# Patient Record
Sex: FEMALE | Race: WHITE | NOT HISPANIC OR LATINO | URBAN - METROPOLITAN AREA
[De-identification: names, ages, dates, MRNs, and addresses within clinical notes are randomized per-mention and may not be internally consistent; named-entity substitution may affect disease eponyms.]

---

## 2020-03-20 ENCOUNTER — INPATIENT (INPATIENT)
Facility: HOSPITAL | Age: 60
LOS: 1 days | Discharge: HOME | DRG: 394 | End: 2020-03-22
Attending: SURGERY | Admitting: SURGERY
Payer: COMMERCIAL

## 2020-03-20 VITALS
HEART RATE: 98 BPM | WEIGHT: 128.97 LBS | RESPIRATION RATE: 16 BRPM | OXYGEN SATURATION: 98 % | HEIGHT: 69 IN | DIASTOLIC BLOOD PRESSURE: 78 MMHG | TEMPERATURE: 99 F | SYSTOLIC BLOOD PRESSURE: 133 MMHG

## 2020-03-20 LAB
ALBUMIN SERPL ELPH-MCNC: 3.2 G/DL — LOW (ref 3.4–5)
ALP SERPL-CCNC: 64 U/L — SIGNIFICANT CHANGE UP (ref 40–120)
ALT FLD-CCNC: 29 U/L — SIGNIFICANT CHANGE UP (ref 12–42)
AMYLASE P1 CFR SERPL: 47 U/L — SIGNIFICANT CHANGE UP (ref 25–115)
ANION GAP SERPL CALC-SCNC: 12 MMOL/L — SIGNIFICANT CHANGE UP (ref 9–16)
APPEARANCE UR: CLEAR — SIGNIFICANT CHANGE UP
AST SERPL-CCNC: 28 U/L — SIGNIFICANT CHANGE UP (ref 15–37)
BASOPHILS # BLD AUTO: 0.06 K/UL — SIGNIFICANT CHANGE UP (ref 0–0.2)
BASOPHILS NFR BLD AUTO: 0.5 % — SIGNIFICANT CHANGE UP (ref 0–2)
BILIRUB SERPL-MCNC: 0.6 MG/DL — SIGNIFICANT CHANGE UP (ref 0.2–1.2)
BILIRUB UR-MCNC: ABNORMAL
BUN SERPL-MCNC: 16 MG/DL — SIGNIFICANT CHANGE UP (ref 7–23)
CALCIUM SERPL-MCNC: 9.1 MG/DL — SIGNIFICANT CHANGE UP (ref 8.5–10.5)
CHLORIDE SERPL-SCNC: 98 MMOL/L — SIGNIFICANT CHANGE UP (ref 96–108)
CO2 SERPL-SCNC: 25 MMOL/L — SIGNIFICANT CHANGE UP (ref 22–31)
COLOR SPEC: YELLOW — SIGNIFICANT CHANGE UP
CREAT SERPL-MCNC: 0.87 MG/DL — SIGNIFICANT CHANGE UP (ref 0.5–1.3)
DIFF PNL FLD: ABNORMAL
EOSINOPHIL # BLD AUTO: 0.03 K/UL — SIGNIFICANT CHANGE UP (ref 0–0.5)
EOSINOPHIL NFR BLD AUTO: 0.2 % — SIGNIFICANT CHANGE UP (ref 0–6)
GLUCOSE SERPL-MCNC: 75 MG/DL — SIGNIFICANT CHANGE UP (ref 70–99)
GLUCOSE UR QL: NEGATIVE — SIGNIFICANT CHANGE UP
HCT VFR BLD CALC: 37.7 % — SIGNIFICANT CHANGE UP (ref 34.5–45)
HGB BLD-MCNC: 12.3 G/DL — SIGNIFICANT CHANGE UP (ref 11.5–15.5)
IMM GRANULOCYTES NFR BLD AUTO: 0.3 % — SIGNIFICANT CHANGE UP (ref 0–1.5)
KETONES UR-MCNC: 40 MG/DL
LEUKOCYTE ESTERASE UR-ACNC: ABNORMAL
LIDOCAIN IGE QN: 55 U/L — LOW (ref 73–393)
LYMPHOCYTES # BLD AUTO: 16.7 % — SIGNIFICANT CHANGE UP (ref 13–44)
LYMPHOCYTES # BLD AUTO: 2.18 K/UL — SIGNIFICANT CHANGE UP (ref 1–3.3)
MCHC RBC-ENTMCNC: 29.1 PG — SIGNIFICANT CHANGE UP (ref 27–34)
MCHC RBC-ENTMCNC: 32.6 GM/DL — SIGNIFICANT CHANGE UP (ref 32–36)
MCV RBC AUTO: 89.3 FL — SIGNIFICANT CHANGE UP (ref 80–100)
MONOCYTES # BLD AUTO: 1.13 K/UL — HIGH (ref 0–0.9)
MONOCYTES NFR BLD AUTO: 8.7 % — SIGNIFICANT CHANGE UP (ref 2–14)
NEUTROPHILS # BLD AUTO: 9.58 K/UL — HIGH (ref 1.8–7.4)
NEUTROPHILS NFR BLD AUTO: 73.6 % — SIGNIFICANT CHANGE UP (ref 43–77)
NITRITE UR-MCNC: NEGATIVE — SIGNIFICANT CHANGE UP
NRBC # BLD: 0 /100 WBCS — SIGNIFICANT CHANGE UP (ref 0–0)
PH UR: 5.5 — SIGNIFICANT CHANGE UP (ref 5–8)
PLATELET # BLD AUTO: 210 K/UL — SIGNIFICANT CHANGE UP (ref 150–400)
POTASSIUM SERPL-MCNC: 3.9 MMOL/L — SIGNIFICANT CHANGE UP (ref 3.5–5.3)
POTASSIUM SERPL-SCNC: 3.9 MMOL/L — SIGNIFICANT CHANGE UP (ref 3.5–5.3)
PROT SERPL-MCNC: 7.9 G/DL — SIGNIFICANT CHANGE UP (ref 6.4–8.2)
PROT UR-MCNC: NEGATIVE MG/DL — SIGNIFICANT CHANGE UP
RBC # BLD: 4.22 M/UL — SIGNIFICANT CHANGE UP (ref 3.8–5.2)
RBC # FLD: 13.4 % — SIGNIFICANT CHANGE UP (ref 10.3–14.5)
SODIUM SERPL-SCNC: 135 MMOL/L — SIGNIFICANT CHANGE UP (ref 132–145)
SP GR SPEC: 1.01 — SIGNIFICANT CHANGE UP (ref 1–1.03)
UROBILINOGEN FLD QL: 0.2 E.U./DL — SIGNIFICANT CHANGE UP
WBC # BLD: 13.02 K/UL — HIGH (ref 3.8–10.5)
WBC # FLD AUTO: 13.02 K/UL — HIGH (ref 3.8–10.5)

## 2020-03-20 PROCEDURE — 74177 CT ABD & PELVIS W/CONTRAST: CPT | Mod: 26

## 2020-03-20 PROCEDURE — 99285 EMERGENCY DEPT VISIT HI MDM: CPT

## 2020-03-20 PROCEDURE — 99221 1ST HOSP IP/OBS SF/LOW 40: CPT | Mod: AI

## 2020-03-20 RX ORDER — SODIUM CHLORIDE 9 MG/ML
1000 INJECTION INTRAMUSCULAR; INTRAVENOUS; SUBCUTANEOUS ONCE
Refills: 0 | Status: COMPLETED | OUTPATIENT
Start: 2020-03-20 | End: 2020-03-20

## 2020-03-20 RX ORDER — METRONIDAZOLE 500 MG
TABLET ORAL
Refills: 0 | Status: DISCONTINUED | OUTPATIENT
Start: 2020-03-20 | End: 2020-03-22

## 2020-03-20 RX ORDER — HEPARIN SODIUM 5000 [USP'U]/ML
5000 INJECTION INTRAVENOUS; SUBCUTANEOUS EVERY 12 HOURS
Refills: 0 | Status: DISCONTINUED | OUTPATIENT
Start: 2020-03-20 | End: 2020-03-22

## 2020-03-20 RX ORDER — ESTRADIOL 4 UG/1
1 INSERT VAGINAL
Qty: 0 | Refills: 0 | DISCHARGE

## 2020-03-20 RX ORDER — ONDANSETRON 8 MG/1
4 TABLET, FILM COATED ORAL EVERY 6 HOURS
Refills: 0 | Status: DISCONTINUED | OUTPATIENT
Start: 2020-03-20 | End: 2020-03-22

## 2020-03-20 RX ORDER — METRONIDAZOLE 500 MG
500 TABLET ORAL ONCE
Refills: 0 | Status: COMPLETED | OUTPATIENT
Start: 2020-03-20 | End: 2020-03-20

## 2020-03-20 RX ORDER — ACETAMINOPHEN 500 MG
650 TABLET ORAL ONCE
Refills: 0 | Status: COMPLETED | OUTPATIENT
Start: 2020-03-20 | End: 2020-03-20

## 2020-03-20 RX ORDER — ACETAMINOPHEN 500 MG
1000 TABLET ORAL ONCE
Refills: 0 | Status: DISCONTINUED | OUTPATIENT
Start: 2020-03-20 | End: 2020-03-22

## 2020-03-20 RX ORDER — SODIUM CHLORIDE 9 MG/ML
1000 INJECTION, SOLUTION INTRAVENOUS
Refills: 0 | Status: DISCONTINUED | OUTPATIENT
Start: 2020-03-20 | End: 2020-03-22

## 2020-03-20 RX ORDER — METRONIDAZOLE 500 MG
500 TABLET ORAL EVERY 8 HOURS
Refills: 0 | Status: DISCONTINUED | OUTPATIENT
Start: 2020-03-20 | End: 2020-03-22

## 2020-03-20 RX ORDER — CEFTRIAXONE 500 MG/1
1000 INJECTION, POWDER, FOR SOLUTION INTRAMUSCULAR; INTRAVENOUS EVERY 24 HOURS
Refills: 0 | Status: DISCONTINUED | OUTPATIENT
Start: 2020-03-21 | End: 2020-03-22

## 2020-03-20 RX ORDER — CEFTRIAXONE 500 MG/1
1000 INJECTION, POWDER, FOR SOLUTION INTRAMUSCULAR; INTRAVENOUS ONCE
Refills: 0 | Status: COMPLETED | OUTPATIENT
Start: 2020-03-20 | End: 2020-03-20

## 2020-03-20 RX ADMIN — CEFTRIAXONE 100 MILLIGRAM(S): 500 INJECTION, POWDER, FOR SOLUTION INTRAMUSCULAR; INTRAVENOUS at 15:41

## 2020-03-20 RX ADMIN — Medication 100 MILLIGRAM(S): at 19:01

## 2020-03-20 RX ADMIN — HEPARIN SODIUM 5000 UNIT(S): 5000 INJECTION INTRAVENOUS; SUBCUTANEOUS at 19:01

## 2020-03-20 RX ADMIN — SODIUM CHLORIDE 1000 MILLILITER(S): 9 INJECTION INTRAMUSCULAR; INTRAVENOUS; SUBCUTANEOUS at 10:34

## 2020-03-20 RX ADMIN — SODIUM CHLORIDE 100 MILLILITER(S): 9 INJECTION, SOLUTION INTRAVENOUS at 19:02

## 2020-03-20 NOTE — ED ADULT NURSE REASSESSMENT NOTE - NS ED NURSE REASSESS COMMENT FT1
Took report from [facility], pt resting flat in stretcher, states it is the most comfortable. Pt in NAD, speaking in full and complete sentences. Pendin CT scan. A&Ox3.

## 2020-03-20 NOTE — ED ADULT TRIAGE NOTE - CHIEF COMPLAINT QUOTE
Sent in by PCP for evaluation of RLQ pain x3days with nausea, no d/v. Pt saw PCP with labs done yesterday, was told to come for r/o Appy d/t elevated WBC. + intermittent fevers, afebrile in triage.

## 2020-03-20 NOTE — ED PROVIDER NOTE - CARE PLAN
Principal Discharge DX:	Acute appendicitis with localized peritonitis, without perforation, abscess, or gangrene  Secondary Diagnosis:	Acute cystitis with hematuria

## 2020-03-20 NOTE — CHART NOTE - NSCHARTNOTEFT_GEN_A_CORE
59F who presented to [facility] with complaints of intermittent fever, abdominal pain, nausea, vomiting for 3 days duration. Was admitted to Surgical service for possible appendicitis. When surgical team performing history there was some concern regarding possible need for COVID testing and called Medicine Consult. Further history gathered patient has been having sore throat off and on since December. Sore throat stopped when recent fever episodes started. Fever was associated with N/V and abdominal pain, there has been no cough or SOB during this time.     Vital Signs Last 24 Hrs  T(C): 36.8 (20 Mar 2020 17:08), Max: 37.3 (20 Mar 2020 15:37)  T(F): 98.2 (20 Mar 2020 17:08), Max: 99.1 (20 Mar 2020 15:37)  HR: 78 (20 Mar 2020 17:08) (78 - 98)  BP: 136/70 (20 Mar 2020 17:08) (130/75 - 137/70)  BP(mean): --  RR: 17 (20 Mar 2020 17:08) (16 - 18)  SpO2: 100% (20 Mar 2020 17:08) (98% - 100%)        LABS                        12.3   13.02 )-----------( 210      ( 20 Mar 2020 10:50 )             37.7     03-20    135  |  98  |  16  ----------------------------<  75  3.9   |  25  |  0.87    Ca    9.1      20 Mar 2020 10:50    TPro  7.9  /  Alb  3.2<L>  /  TBili  0.6  /  DBili  x   /  AST  28  /  ALT  29  /  AlkPhos  64  03-20      Urinalysis Basic - ( 20 Mar 2020 10:50 )    Color: Yellow / Appearance: Clear / S.015 / pH: x  Gluc: x / Ketone: 40 mg/dL  / Bili: Small / Urobili: 0.2 E.U./dL   Blood: x / Protein: NEGATIVE mg/dL / Nitrite: NEGATIVE   Leuk Esterase: Moderate / RBC: 5-10 /HPF / WBC > 10 /HPF   Sq Epi: x / Non Sq Epi: Many /HPF / Bacteria: Many /HPF      Assessment:  Patient currently does not meet guidelines for testing for COVID-19 given there is no cough or SOB. Labs also do not show lymphopenia or transaminitis. Likely sources of fever include Appendicitis and/or UTI.   -Recommend no COVID testing at this time, if symptoms change please alert Med Consult team  -Based off CTAP would recommend echocardiogram to evaluate pericardial effusion  -Fever sources likely appendicitis vs UTI  -This is not a formal Medicine Consult, if you would like Medicine Consult to continue to follow the patient please let team know    Case discussed with COVID on call attending.

## 2020-03-20 NOTE — H&P ADULT - NSHPPHYSICALEXAM_GEN_ALL_CORE
Vital Signs Last 24 Hrs  T(C): 36.8 (20 Mar 2020 17:08), Max: 37.3 (20 Mar 2020 15:37)  T(F): 98.2 (20 Mar 2020 17:08), Max: 99.1 (20 Mar 2020 15:37)  HR: 78 (20 Mar 2020 17:08) (78 - 98)  BP: 136/70 (20 Mar 2020 17:08) (130/75 - 137/70)  BP(mean): --  RR: 17 (20 Mar 2020 17:08) (16 - 18)  SpO2: 100% (20 Mar 2020 17:08) (98% - 100%) Vital Signs Last 24 Hrs  T(C): 36.8 (20 Mar 2020 17:08), Max: 37.3 (20 Mar 2020 15:37)  T(F): 98.2 (20 Mar 2020 17:08), Max: 99.1 (20 Mar 2020 15:37)  HR: 78 (20 Mar 2020 17:08) (78 - 98)  BP: 136/70 (20 Mar 2020 17:08) (130/75 - 137/70)  BP(mean): --  RR: 17 (20 Mar 2020 17:08) (16 - 18)  SpO2: 100% (20 Mar 2020 17:08) (98% - 100%)    General: NAD, resting comfortably in bed  C/V: NSR  Pulm: Nonlabored breathing, no respiratory distress  Abd: soft, nondistended, mildly TTP throughout abdomen, no rebound/guarding  Extrem: WWP, no edema

## 2020-03-20 NOTE — ED PROVIDER NOTE - RESPIRATORY, MLM
Incoming call from patient, she states, she does have the proper required documentation and will bring them with her to the Stamaril vaccine appointment.  Chart Prep complete.   Breath sounds clear and equal bilaterally.

## 2020-03-20 NOTE — ED PROVIDER NOTE - OBJECTIVE STATEMENT
60 y/o female with no significant PMHx, on Yuvafem, presents to ED c/o abd pain x 3 days. Patient reports bilateral lower abd pain, greater in the RLQ, with associated intermittent fever with tmax of 38.6C, nausea, and decreased appetite. Also reports frequent loose stools but denies any diarrhea. Also denies any vomiting, blood in stool, vaginal discharge, urinary symptoms, or back pain. States she went to the doctor yesterday for blood work, and was told today her WBC was elevated, so she came to the ED. Patient has not taken any meds for thsi. Denies any prior abd surgeries.

## 2020-03-20 NOTE — H&P ADULT - HISTORY OF PRESENT ILLNESS
59F w/ no significant PMHx comes in with abdomal pain and "flu like symptoms" x unknown number of days. She states a couple of nights ago she started to feel like she had the flu and states she had a fever (38.6), sore throat, and back pain. Last night she began to have abdominal pain throughout her lower abdomen. She states she has had loose stools throughout this time. Yesterday she went to the Dr where labs were drawn and she was told she had an elefated WBC and was sent to [facility].  Denies N/V, constipation, sick contacts. Last colonscopy 2-3 years ago (found redundant colon?) otherwise wnl.

## 2020-03-20 NOTE — H&P ADULT - NSHPLABSRESULTS_GEN_ALL_CORE
LABS:                        12.3   13.02 )-----------( 210      ( 20 Mar 2020 10:50 )             37.7     -    135  |  98  |  16  ----------------------------<  75  3.9   |  25  |  0.87    Ca    9.1      20 Mar 2020 10:50    TPro  7.9  /  Alb  3.2<L>  /  TBili  0.6  /  DBili  x   /  AST  28  /  ALT  29  /  AlkPhos  64        Urinalysis Basic - ( 20 Mar 2020 10:50 )    Color: Yellow / Appearance: Clear / S.015 / pH: x  Gluc: x / Ketone: 40 mg/dL  / Bili: Small / Urobili: 0.2 E.U./dL   Blood: x / Protein: NEGATIVE mg/dL / Nitrite: NEGATIVE   Leuk Esterase: Moderate / RBC: 5-10 /HPF / WBC > 10 /HPF   Sq Epi: x / Non Sq Epi: Many /HPF / Bacteria: Many /HPF        RADIOLOGY & ADDITIONAL STUDIES:  CT Abdomen and Pelvis w/ IV Cont:   EXAM:  CT ABDOMEN AND PELVIS IC                           PROCEDURE DATE:  2020      INTERPRETATION:  CT SCAN OF ABDOMEN AND PELVIS    History: Right lower quadrant pain, elevated WBC    Technique: CT scan of abdomen and pelvis was performed from lung bases through symphysis pubis. Intravenous contrast material were utilized. Axial, sagittal and coronal reformatted images were reviewed. 100 mL of Omnipaque 350 injected.    Comparison: None.    Findings:     Lower chest: Moderate inferior pericardial effusion-partly seen. Trace pleural fluid in the right posterior costophrenic angle.  Liver:  There is a 1.7 x 1.1 x 1.9 cm well-circumscribed hypoattenuating lesion at the medial aspect of the caudate lobe and less well-defined hypoattenuating area adjacent to the falciform ligament, probable for focal fatty infiltrates. There are 2 subcentimeter hypoattenuating lesions within the right lobe, too small to characterize. The three lesions in the liver are suspicious for hemangiomata.  Gallbladder: No radiopaque stones gallbladder.  Spleen:  Normal.  Pancreas:  Normal.  Adrenal glands:  Normal.  Kidneys: Normal.  Adenopathy:  No lymphadenopathy in abdomen or pelvis.  Ascites: None.  Gastrointestinal tract: Evaluation is limited without the use of oral contrast. There is a slightly thick-walled blind-ending tubular structure measuring 1.2 cm in diameter in the pelvis (best seen on series 2 image 86), suspicious for acute appendicitis. The appendix is pelvic. The appendix is situated medially to the right ovary and laterally to the uterine fundus.The appendix does not contain air. There is no periappendiceal abscess. No appendicolith. No free air. There are scattered colonic diverticula.  Small amount of pelvic free fluid.  Vessels: There is scant calcification within the bilateral common iliac arteries. Dilated IVC  Pelvic organs: There is a 1.9 cm intramural leiomyoma left fundus. A subcentimeter focal hyperdensity seen in the mid uterine body could represent another leiomyoma. The ovaries are grossly normal.. The bladder is unremarkable. No adnexal masses.  Soft tissues: Normal.  Bones: There is a subcentimeter focal sclerotic bone island at the left inferior articular facet of the L3 vertebral body. Otherwise unremarkable.    Impression:   Dilated and thick-walled appendix measuring up to 1.2 cm in diameter, suspicious for  acute appendicitis. The appendix is pelvic. The appendix is situated medially to the right ovary and laterallyto the uterine fundus. No periappendiceal abscess or free air..  Incidental pericardial effusion.

## 2020-03-20 NOTE — ED PROVIDER NOTE - CLINICAL SUMMARY MEDICAL DECISION MAKING FREE TEXT BOX
58 y/o female presents with abd pain x 3 days with intermittent fever and nausea. Patient had blood work done at clinic yesterday and was told today that her WBC was elevated. VSS, patient is currently afebrile. Will obtain blood work, CT abd/pelvis to r/o appendicitis, provide IV fluids for hydration, and will reassess. Patient offered antiemetics but declined at this time.

## 2020-03-20 NOTE — ED PROVIDER NOTE - PROGRESS NOTE DETAILS
discussed case with Dr. Camarillo who accepts pt. will give ceftriaxone for UTI and Dr. Camarillo said no additional abx needed for appendicitis.

## 2020-03-20 NOTE — H&P ADULT - ATTENDING COMMENTS
Patient seen and examined at bedside on 3/21/2020.  Stated pain is possibly mildly improved.    Abdomen soft, ND, mild RLQ tenderness.    Labs, CT reviewed.  WBC down to 9 today (13 on admission).    Repeat CT abdomen/pelvis with PO and IV contrast to fully evaluate GI tract given atypical presentation

## 2020-03-20 NOTE — H&P ADULT - ASSESSMENT
59F no significant PMHx presents with abdominal pain and flu like symptoms for 3-4 nights (abd pain starting last night), WBC 13 and CT findings of dilated and thick-walled appendix measuring up to 1.2 cm in diameter, suspicious for acute appendicitis, appendix is pelvic.    NPO/IVF  Pain/nausea control  OOBA/IS/SCDs  Ceftriaxone/flagyl

## 2020-03-20 NOTE — ED PROVIDER NOTE - ATTENDING CONTRIBUTION TO CARE
Pt w lower abd pain, worked up for abd pain. leukocytosis, CT abd w dilated appendix concerning for appendicitis.

## 2020-03-21 LAB
ANION GAP SERPL CALC-SCNC: 17 MMOL/L — SIGNIFICANT CHANGE UP (ref 5–17)
APTT BLD: 41.9 SEC — HIGH (ref 27.5–36.3)
BLD GP AB SCN SERPL QL: NEGATIVE — SIGNIFICANT CHANGE UP
BUN SERPL-MCNC: 12 MG/DL — SIGNIFICANT CHANGE UP (ref 7–23)
CALCIUM SERPL-MCNC: 9.2 MG/DL — SIGNIFICANT CHANGE UP (ref 8.4–10.5)
CHLORIDE SERPL-SCNC: 103 MMOL/L — SIGNIFICANT CHANGE UP (ref 96–108)
CO2 SERPL-SCNC: 22 MMOL/L — SIGNIFICANT CHANGE UP (ref 22–31)
CREAT SERPL-MCNC: 0.72 MG/DL — SIGNIFICANT CHANGE UP (ref 0.5–1.3)
CULTURE RESULTS: SIGNIFICANT CHANGE UP
GLUCOSE BLDC GLUCOMTR-MCNC: 74 MG/DL — SIGNIFICANT CHANGE UP (ref 70–99)
GLUCOSE SERPL-MCNC: 57 MG/DL — LOW (ref 70–99)
HCT VFR BLD CALC: 38.7 % — SIGNIFICANT CHANGE UP (ref 34.5–45)
HGB BLD-MCNC: 12.2 G/DL — SIGNIFICANT CHANGE UP (ref 11.5–15.5)
INR BLD: 1.32 — HIGH (ref 0.88–1.16)
MAGNESIUM SERPL-MCNC: 2.2 MG/DL — SIGNIFICANT CHANGE UP (ref 1.6–2.6)
MCHC RBC-ENTMCNC: 28.8 PG — SIGNIFICANT CHANGE UP (ref 27–34)
MCHC RBC-ENTMCNC: 31.5 GM/DL — LOW (ref 32–36)
MCV RBC AUTO: 91.5 FL — SIGNIFICANT CHANGE UP (ref 80–100)
NRBC # BLD: 0 /100 WBCS — SIGNIFICANT CHANGE UP (ref 0–0)
PHOSPHATE SERPL-MCNC: 3.1 MG/DL — SIGNIFICANT CHANGE UP (ref 2.5–4.5)
PLATELET # BLD AUTO: 230 K/UL — SIGNIFICANT CHANGE UP (ref 150–400)
POTASSIUM SERPL-MCNC: 4.3 MMOL/L — SIGNIFICANT CHANGE UP (ref 3.5–5.3)
POTASSIUM SERPL-SCNC: 4.3 MMOL/L — SIGNIFICANT CHANGE UP (ref 3.5–5.3)
PROTHROM AB SERPL-ACNC: 15.2 SEC — HIGH (ref 10–12.9)
RBC # BLD: 4.23 M/UL — SIGNIFICANT CHANGE UP (ref 3.8–5.2)
RBC # FLD: 13.4 % — SIGNIFICANT CHANGE UP (ref 10.3–14.5)
RH IG SCN BLD-IMP: POSITIVE — SIGNIFICANT CHANGE UP
SODIUM SERPL-SCNC: 142 MMOL/L — SIGNIFICANT CHANGE UP (ref 135–145)
SPECIMEN SOURCE: SIGNIFICANT CHANGE UP
WBC # BLD: 9.15 K/UL — SIGNIFICANT CHANGE UP (ref 3.8–10.5)
WBC # FLD AUTO: 9.15 K/UL — SIGNIFICANT CHANGE UP (ref 3.8–10.5)

## 2020-03-21 PROCEDURE — 74177 CT ABD & PELVIS W/CONTRAST: CPT | Mod: 26

## 2020-03-21 PROCEDURE — 93306 TTE W/DOPPLER COMPLETE: CPT | Mod: 26

## 2020-03-21 RX ORDER — METOPROLOL TARTRATE 50 MG
5 TABLET ORAL ONCE
Refills: 0 | Status: COMPLETED | OUTPATIENT
Start: 2020-03-21 | End: 2020-03-21

## 2020-03-21 RX ORDER — DEXTROSE 50 % IN WATER 50 %
25 SYRINGE (ML) INTRAVENOUS ONCE
Refills: 0 | Status: COMPLETED | OUTPATIENT
Start: 2020-03-21 | End: 2020-03-21

## 2020-03-21 RX ORDER — IOHEXOL 300 MG/ML
30 INJECTION, SOLUTION INTRAVENOUS ONCE
Refills: 0 | Status: COMPLETED | OUTPATIENT
Start: 2020-03-21 | End: 2020-03-21

## 2020-03-21 RX ADMIN — Medication 25 MILLILITER(S): at 09:36

## 2020-03-21 RX ADMIN — Medication 100 MILLIGRAM(S): at 11:31

## 2020-03-21 RX ADMIN — Medication 5 MILLIGRAM(S): at 07:09

## 2020-03-21 RX ADMIN — Medication 100 MILLIGRAM(S): at 19:34

## 2020-03-21 RX ADMIN — HEPARIN SODIUM 5000 UNIT(S): 5000 INJECTION INTRAVENOUS; SUBCUTANEOUS at 18:05

## 2020-03-21 RX ADMIN — SODIUM CHLORIDE 110 MILLILITER(S): 9 INJECTION, SOLUTION INTRAVENOUS at 03:01

## 2020-03-21 RX ADMIN — HEPARIN SODIUM 5000 UNIT(S): 5000 INJECTION INTRAVENOUS; SUBCUTANEOUS at 06:00

## 2020-03-21 RX ADMIN — CEFTRIAXONE 100 MILLIGRAM(S): 500 INJECTION, POWDER, FOR SOLUTION INTRAMUSCULAR; INTRAVENOUS at 12:27

## 2020-03-21 RX ADMIN — Medication 100 MILLIGRAM(S): at 03:01

## 2020-03-21 RX ADMIN — IOHEXOL 30 MILLILITER(S): 300 INJECTION, SOLUTION INTRAVENOUS at 14:00

## 2020-03-21 NOTE — PROGRESS NOTE ADULT - SUBJECTIVE AND OBJECTIVE BOX
INTERVAL HPI/OVERNIGHT EVENTS: +F/+BM (today), pain improved, +N/-V, No covid testing. This AM pt is complaining of chest fluttering. EKG performed, which showed 's. Cardiology consulted for possible afib vs. SVT. 5 metoprolol given. Otherwise, pt feeling okay, denies any n/v/cp/sob.     MEDICATIONS  (STANDING):  cefTRIAXone   IVPB 1000 milliGRAM(s) IV Intermittent every 24 hours  heparin  Injectable 5000 Unit(s) SubCutaneous every 12 hours  lactated ringers. 1000 milliLiter(s) (110 mL/Hr) IV Continuous <Continuous>  metroNIDAZOLE  IVPB 500 milliGRAM(s) IV Intermittent every 8 hours  metroNIDAZOLE  IVPB        MEDICATIONS  (PRN):  acetaminophen  IVPB .. 1000 milliGRAM(s) IV Intermittent once PRN Moderate Pain (4 - 6)  ondansetron Injectable 4 milliGRAM(s) IV Push every 6 hours PRN Nausea      Vital Signs Last 24 Hrs  T(C): 36.4 (21 Mar 2020 05:12), Max: 37.7 (20 Mar 2020 20:32)  T(F): 97.6 (21 Mar 2020 05:12), Max: 99.9 (20 Mar 2020 20:32)  HR: 136 (21 Mar 2020 07:07) (63 - 136)  BP: 102/65 (21 Mar 2020 07:07) (102/65 - 137/70)  BP(mean): --  RR: 16 (21 Mar 2020 07:07) (16 - 18)  SpO2: 98% (21 Mar 2020 07:07) (96% - 100%)    PHYSICAL EXAM:      Constitutional: A&Ox3    Respiratory: non labored breathing, no respiratory distress    Cardiovascular: tachycardic to 150's    Gastrointestinal: abdomen is soft, tender to palpation in RLQ, non-distended                Extremities: (-) edema                  I&O's Detail    20 Mar 2020 07:01  -  21 Mar 2020 07:00  --------------------------------------------------------  IN:    IV PiggyBack: 100 mL    lactated ringers.: 1360 mL    Solution: 100 mL  Total IN: 1560 mL    OUT:    Voided: 400 mL  Total OUT: 400 mL    Total NET: 1160 mL      21 Mar 2020 07:01  -  21 Mar 2020 07:27  --------------------------------------------------------  IN:    lactated ringers.: 110 mL  Total IN: 110 mL    OUT:  Total OUT: 0 mL    Total NET: 110 mL          LABS:                        12.2   9.15  )-----------( 230      ( 21 Mar 2020 06:23 )             38.7     03-20    135  |  98  |  16  ----------------------------<  75  3.9   |  25  |  0.87    Ca    9.1      20 Mar 2020 10:50    TPro  7.9  /  Alb  3.2<L>  /  TBili  0.6  /  DBili  x   /  AST  28  /  ALT  29  /  AlkPhos  64  03-20    PT/INR - ( 21 Mar 2020 06:23 )   PT: 15.2 sec;   INR: 1.32          PTT - ( 21 Mar 2020 06:23 )  PTT:41.9 sec  Urinalysis Basic - ( 20 Mar 2020 10:50 )    Color: Yellow / Appearance: Clear / S.015 / pH: x  Gluc: x / Ketone: 40 mg/dL  / Bili: Small / Urobili: 0.2 E.U./dL   Blood: x / Protein: NEGATIVE mg/dL / Nitrite: NEGATIVE   Leuk Esterase: Moderate / RBC: 5-10 /HPF / WBC > 10 /HPF   Sq Epi: x / Non Sq Epi: Many /HPF / Bacteria: Many /HPF        RADIOLOGY & ADDITIONAL STUDIES:

## 2020-03-21 NOTE — CHART NOTE - NSCHARTNOTEFT_GEN_A_CORE
Upon Nutritional Assessment by the Registered Dietitian your patient was determined to meet criteria / has evidence of the following diagnosis/diagnoses:          [ ]  Mild Protein Calorie Malnutrition        [x ]  Moderate Protein Calorie Malnutrition        [ ] Severe Protein Calorie Malnutrition        [ ] Unspecified Protein Calorie Malnutrition        [ ] Underweight / BMI <19        [ ] Morbid Obesity / BMI > 40      Findings as based on:  •  Comprehensive nutrition assessment and consultation    Pt is noted with moderate b/l orbital, moderate b/l buccal and moderate b/l clavicular wasting.    Given current moderate muscle and fat wasting in combination with suspected inadequate energy intake and reported weight loss there is concern for moderate malnutrition.    Treatment:    1. As medically feasible, advance diet to Clear Liquids   2. As tolerated, continue diet advancement to Regular  3. Monitor for GI distress  4. Pending PO intake, consider ONS as necessary to meet estimated nutrient requirements  5. Add Multivitamin daily     PROVIDER Section:     By signing this assessment you are acknowledging and agree with the diagnosis/diagnoses assigned by the Registered Dietitian    Comments: no

## 2020-03-21 NOTE — CONSULT NOTE ADULT - ASSESSMENT
59 F w/ hx of mitral valve prolapse (dx early 20's) admitted to surgery for acute appendicitis. Cardiology consulted for new tachyarrhythmia.     # Atrial Fibrillation, new onset. Hemodynamically stable. s/p Lopressor 5mg IVP x 1 with HR improving 100-110.   - hx of MVP; please obtain TTE   - CHADS-VASc of 1 & potentially going to OR today for appendicitis, therefore, no need for anticoagulation at the moment.    Incomplete Note 59 F w/ hx of mitral valve prolapse (dx early 20's) admitted to surgery for acute appendicitis. Cardiology consulted for new tachyarrhythmia.     # Atrial Fibrillation, new onset. Hemodynamically stable. s/p Lopressor 5mg IVP x 1. Resolved, currently NSR 60's. Etiology likely 2/2 sepsis from acute appendicitis and hypoglycemia.  - CHADS-VASc of 1 & potentially going to OR today for appendicitis, therefore, no need for anticoagulation at the moment.  - No need for rate control right now. If were to go back into atrial fibrillation, would start Lopressor 2.5mg IVP q8hr  - continue to treat underlying cause w/ IV antibiotics and IV fluid     #Moderate Pericardial Effusion: Incidental finding on TTE. Unclear etiology  - No clinical signs of tamponade   - continue treatment with IV fluids   - monitor on telemetry for HD instability     No contraindication for surgery from a cardiology perspective     Will continue to follow

## 2020-03-21 NOTE — DIETITIAN INITIAL EVALUATION ADULT. - OTHER INFO
Pt with past medical history significant for mitral valve prolapse.  Pt presents with abdominal pain, flu-like symptoms (fever and sore throat); CT findings of dilated and thick-walled appendix suspicious for acute appendicitis.  Pt endorses good appetite/intake at baseline.  Meals consist of well-cooked vegetables, avocado, tomato, lean proteins.  Pt denies any food allergies/intolerances, however, endorses GI distress with excessive raw or fatty foods.  Pt reports unintentional weight loss x3-5 years ago (pt unable to recall exact time frame).  UBW ~63-65kg; 8.5-10.5kg weight loss reported with lowest weight ~54.5kg of which pt attributes to GI distress/PO intolerance.  Pt reports regain of some weight and is currently stable at 58.5kg since Summer 2019.  Pt reports success with CBD oil for improved digestion.  Pt is noted with moderate b/l orbital, moderate b/l buccal and moderate b/l clavicular wasting.  Given current moderate muscle and fat wasting in combination with suspected inadequate energy intake and reported weight loss there is concern for moderate malnutrition.  Pt remains NPO at this time.  Pt endorses some hunger, however, is feeling more thirsty.  Pt denies n/v/d/c or pain.  Skin: intact.

## 2020-03-21 NOTE — DIETITIAN INITIAL EVALUATION ADULT. - ADD RECOMMEND
1. As medically feasible, advance diet to Clear Liquids   2. Monitor for GI distress  3. As tolerated, continue diet advancement to Regular  4. Please confirm suspected malnutrition

## 2020-03-21 NOTE — DIETITIAN INITIAL EVALUATION ADULT. - ENERGY NEEDS
ABW used to calculate energy needs due to pt's current body weight within % IBW   Needs calculated for weight maintenance and increased secondary to inflammation and suspected moderate malnutrition

## 2020-03-21 NOTE — CONSULT NOTE ADULT - SUBJECTIVE AND OBJECTIVE BOX
HPI:  59 F w/ hx of mitral valve prolapse (dx early 20's) admitted to surgery for acute appendicitis. Cardiology consulted for new tachyarrhythmia Patient felt palpitations, fast, "bounding" heart rate this morning. ECG was obtained, which showed atrial fibrillation w/ -150's. She received Lopressor 5mg IVP x 1 dose with decreasing -110. Patient seen and examined, feels "better", without chest pain, lightheadedness, or SOB. She denies any hx of arrythmia or other cardiac disease. Family hx + father w/ HTN.       CARDIAC DIAGNOSTIC TESTING:      ECG: A-Fib w/ 's     ECHO  FINDINGS: pending       PAST MEDICAL & SURGICAL HISTORY:  No pertinent past medical history  No significant past surgical history      HOME MEDICATIONS  T(C): 36.4 (20 @ 05:12), Max: 37.7 (20 @ 20:32)  HR: 106 (20 @ 07:33) (63 - 136)  BP: 109/65 (20 @ 07:33) (102/65 - 137/70)  RR: 16 (20 @ 07:33) (16 - 18)  SpO2: 98% (20 @ 07:07) (96% - 100%)    MEDICATIONS  (STANDING):  cefTRIAXone   IVPB 1000 milliGRAM(s) IV Intermittent every 24 hours  heparin  Injectable 5000 Unit(s) SubCutaneous every 12 hours  lactated ringers. 1000 milliLiter(s) (110 mL/Hr) IV Continuous <Continuous>  metroNIDAZOLE  IVPB 500 milliGRAM(s) IV Intermittent every 8 hours  metroNIDAZOLE  IVPB        MEDICATIONS  (PRN):  acetaminophen  IVPB .. 1000 milliGRAM(s) IV Intermittent once PRN Moderate Pain (4 - 6)  ondansetron Injectable 4 milliGRAM(s) IV Push every 6 hours PRN Nausea      FAMILY HISTORY: + Father, HTN    REVIEW OF SYSTEMS:  CONSTITUTIONAL: No fever, weight loss, or fatigue  EYES: No eye pain, visual disturbances, or discharge  ENMT:  No difficulty hearing, tinnitus, vertigo; No sinus or throat pain  NECK: No pain or stiffness  RESPIRATORY: No cough, wheezing, chills or hemoptysis; No Shortness of Breath  CARDIOVASCULAR: No chest pain, palpitations, passing out, dizziness, or leg swelling  GASTROINTESTINAL: No abdominal or epigastric pain. No nausea, vomiting, or hematemesis; No diarrhea or constipation. No melena or hematochezia.  GENITOURINARY: No dysuria, frequency, hematuria, or incontinence  NEUROLOGICAL: No headaches, memory loss, loss of strength, numbness, or tremors  SKIN: No itching, burning, rashes, or lesions   LYMPH Nodes: No enlarged glands  ENDOCRINE: No heat or cold intolerance; No hair loss  MUSCULOSKELETAL: No joint pain or swelling; No muscle, back, or extremity pain  PSYCHIATRIC: No depression, anxiety, mood swings, or difficulty sleeping  HEME/LYMPH: No easy bruising, or bleeding gums  ALLERY AND IMMUNOLOGIC: No hives or eczema    Vitals past 24 Hours: T(C): 36.4 (20 @ 05:12), Max: 37.7 (20 @ 20:32)  HR: 106 (20 @ 07:33) (63 - 136)  BP: 109/65 (20 @ 07:33) (102/65 - 137/70)  RR: 16 (20 @ 07:33) (16 - 18)  SpO2: 98% (20 @ 07:07) (96% - 100%)	    PHYSICAL EXAM:  GEN: No acute respiratory distress, appears stated age	  HEENT: Anicteric sclera, PERRL, EOMI, no oropharyngeal erythema or discharge, trachea midline  Lymphatic: No cervical lymphadenopathy  Cardiovascular: S1 S2, No JVD, No murmurs, PMI  Respiratory: Lungs clear to auscultation, no rrw  Gastrointestinal:  BS+, soft, non distended, non tender, no HSM  Skin: No rashes, No ecchymoses, No cyanosis  Neurologic: Non-focal, AAO x 3, strength grossly normal in all extremeties  Extremities: Normal range of motion, No clubbing, cyanosis or edema  Vascular: Radial 2+, DP 2+      I&O's Detail    20 Mar 2020 07:01  -  21 Mar 2020 07:00  --------------------------------------------------------  IN:    IV PiggyBack: 100 mL    lactated ringers.: 1360 mL    Solution: 100 mL  Total IN: 1560 mL    OUT:    Voided: 400 mL  Total OUT: 400 mL    Total NET: 1160 mL      21 Mar 2020 07:01  -  21 Mar 2020 07:53  --------------------------------------------------------  IN:    lactated ringers.: 110 mL  Total IN: 110 mL    OUT:  Total OUT: 0 mL    Total NET: 110 mL          LABS:                        12.2   9.15  )-----------( 230      ( 21 Mar 2020 06:23 )             38.7         142  |  103  |  12  ----------------------------<  57<L>  4.3   |  22  |  0.72    Ca    9.1      20 Mar 2020 10:50  Phos  3.1       Mg     2.2         TPro  7.9  /  Alb  3.2<L>  /  TBili  0.6  /  DBili  x   /  AST  28  /  ALT  29  /  AlkPhos  64  -20        PT/INR - ( 21 Mar 2020 06:23 )   PT: 15.2 sec;   INR: 1.32          PTT - ( 21 Mar 2020 06:23 )  PTT:41.9 sec  Urinalysis Basic - ( 20 Mar 2020 10:50 )    Color: Yellow / Appearance: Clear / S.015 / pH: x  Gluc: x / Ketone: 40 mg/dL  / Bili: Small / Urobili: 0.2 E.U./dL   Blood: x / Protein: NEGATIVE mg/dL / Nitrite: NEGATIVE   Leuk Esterase: Moderate / RBC: 5-10 /HPF / WBC > 10 /HPF   Sq Epi: x / Non Sq Epi: Many /HPF / Bacteria: Many /HPF      I&O's Summary    20 Mar 2020 07:01  -  21 Mar 2020 07:00  --------------------------------------------------------  IN: 1560 mL / OUT: 400 mL / NET: 1160 mL    21 Mar 2020 07:01  -  21 Mar 2020 07:53  --------------------------------------------------------  IN: 110 mL / OUT: 0 mL / NET: 110 mL        RADIOLOGY & ADDITIONAL STUDIES:  < from: CT Abdomen and Pelvis w/ IV Cont (20 @ 13:27) >  Impression:   Dilated and thick-walled appendix measuring up to 1.2 cm in diameter, suspicious for  acute appendicitis. The appendix is pelvic. The appendix is situated medially to the right ovary and laterallyto the uterine fundus. No periappendiceal abscess or free air..  Incidental pericardial effusion.    < end of copied text > HPI:  59 F w/ hx of mitral valve prolapse (dx early 20's) admitted to surgery for acute appendicitis. Cardiology consulted for new tachyarrhythmia Patient felt palpitations, fast, "bounding" heart rate this morning. ECG was obtained, which showed atrial fibrillation w/ -150's. She received Lopressor 5mg IVP x 1 dose with decreasing -110. Patient seen and examined, feels "better", without chest pain, lightheadedness, or SOB. She denies any hx of arrythmia or other cardiac disease. Family hx + father w/ HTN.       CARDIAC DIAGNOSTIC TESTING:      ECG: A-Fib w/ 's     ECHO  FINDINGS:   < from: TTE Echo Complete w/o contrast w/ Doppler (20 @ 09:05) >  CONCLUSIONS:     1. Patient tachycardic during the study.   2. No significant valvular disease.   3. Normal left and right ventricular systolic function.   4. Moderate pericardial effusion. There is no significant (<30%) respiratory variation in the maximal mitral E wave velocity ( despite patient being in atrial fibrillation). Fibrinous vs coagulant material is seen in the pericardial space. There is early diastolic RV base invagination. No other chamber collapse noted. The inferior vena cava is dilated (>2.1cm) with abnormal inspiratory collapse (<50%) consistent with elevated right atrial pressure (  15, range 10-20mmHg). All these findings are suggestive of increased intrapericardial pressure. Cardiac tamponade is a clinical diagnosis. Clinical correlation required.      PAST MEDICAL & SURGICAL HISTORY:  No pertinent past medical history  No significant past surgical history      HOME MEDICATIONS  T(C): 36.4 (20 @ 05:12), Max: 37.7 (20 @ 20:32)  HR: 106 (20 @ 07:33) (63 - 136)  BP: 109/65 (20 @ 07:33) (102/65 - 137/70)  RR: 16 (20 @ 07:33) (16 - 18)  SpO2: 98% (20 @ 07:07) (96% - 100%)    MEDICATIONS  (STANDING):  cefTRIAXone   IVPB 1000 milliGRAM(s) IV Intermittent every 24 hours  heparin  Injectable 5000 Unit(s) SubCutaneous every 12 hours  lactated ringers. 1000 milliLiter(s) (110 mL/Hr) IV Continuous <Continuous>  metroNIDAZOLE  IVPB 500 milliGRAM(s) IV Intermittent every 8 hours  metroNIDAZOLE  IVPB        MEDICATIONS  (PRN):  acetaminophen  IVPB .. 1000 milliGRAM(s) IV Intermittent once PRN Moderate Pain (4 - 6)  ondansetron Injectable 4 milliGRAM(s) IV Push every 6 hours PRN Nausea      FAMILY HISTORY: + Father, HTN    REVIEW OF SYSTEMS:  CONSTITUTIONAL: No fever, weight loss, or fatigue  EYES: No eye pain, visual disturbances, or discharge  ENMT:  No difficulty hearing, tinnitus, vertigo; No sinus or throat pain  NECK: No pain or stiffness  RESPIRATORY: No cough, wheezing, chills or hemoptysis; No Shortness of Breath  CARDIOVASCULAR: No chest pain, palpitations, passing out, dizziness, or leg swelling  GASTROINTESTINAL: No abdominal or epigastric pain. No nausea, vomiting, or hematemesis; No diarrhea or constipation. No melena or hematochezia.  GENITOURINARY: No dysuria, frequency, hematuria, or incontinence  NEUROLOGICAL: No headaches, memory loss, loss of strength, numbness, or tremors  SKIN: No itching, burning, rashes, or lesions   LYMPH Nodes: No enlarged glands  ENDOCRINE: No heat or cold intolerance; No hair loss  MUSCULOSKELETAL: No joint pain or swelling; No muscle, back, or extremity pain  PSYCHIATRIC: No depression, anxiety, mood swings, or difficulty sleeping  HEME/LYMPH: No easy bruising, or bleeding gums  ALLERY AND IMMUNOLOGIC: No hives or eczema    Vitals past 24 Hours: T(C): 36.4 (20 @ 05:12), Max: 37.7 (20 @ 20:32)  HR: 106 (20 @ 07:33) (63 - 136)  BP: 109/65 (20 @ 07:33) (102/65 - 137/70)  RR: 16 (20 @ 07:33) (16 - 18)  SpO2: 98% (20 @ 07:07) (96% - 100%)	    PHYSICAL EXAM:  GEN: No acute respiratory distress, appears stated age	  HEENT: Anicteric sclera, PERRL, EOMI, no oropharyngeal erythema or discharge, trachea midline  Lymphatic: No cervical lymphadenopathy  Cardiovascular: S1 S2, No JVD, No murmurs, PMI  Respiratory: Lungs clear to auscultation, no rrw  Gastrointestinal:  BS+, soft, non distended, non tender, no HSM  Skin: No rashes, No ecchymoses, No cyanosis  Neurologic: Non-focal, AAO x 3, strength grossly normal in all extremeties  Extremities: Normal range of motion, No clubbing, cyanosis or edema  Vascular: Radial 2+, DP 2+      I&O's Detail    20 Mar 2020 07:01  -  21 Mar 2020 07:00  --------------------------------------------------------  IN:    IV PiggyBack: 100 mL    lactated ringers.: 1360 mL    Solution: 100 mL  Total IN: 1560 mL    OUT:    Voided: 400 mL  Total OUT: 400 mL    Total NET: 1160 mL      21 Mar 2020 07:01  -  21 Mar 2020 07:53  --------------------------------------------------------  IN:    lactated ringers.: 110 mL  Total IN: 110 mL    OUT:  Total OUT: 0 mL    Total NET: 110 mL          LABS:                        12.2   9.15  )-----------( 230      ( 21 Mar 2020 06:23 )             38.7     -    142  |  103  |  12  ----------------------------<  57<L>  4.3   |  22  |  0.72    Ca    9.1      20 Mar 2020 10:50  Phos  3.1     -  Mg     2.2     -    TPro  7.9  /  Alb  3.2<L>  /  TBili  0.6  /  DBili  x   /  AST  28  /  ALT  29  /  AlkPhos  64  03-20        PT/INR - ( 21 Mar 2020 06:23 )   PT: 15.2 sec;   INR: 1.32          PTT - ( 21 Mar 2020 06:23 )  PTT:41.9 sec  Urinalysis Basic - ( 20 Mar 2020 10:50 )    Color: Yellow / Appearance: Clear / S.015 / pH: x  Gluc: x / Ketone: 40 mg/dL  / Bili: Small / Urobili: 0.2 E.U./dL   Blood: x / Protein: NEGATIVE mg/dL / Nitrite: NEGATIVE   Leuk Esterase: Moderate / RBC: 5-10 /HPF / WBC > 10 /HPF   Sq Epi: x / Non Sq Epi: Many /HPF / Bacteria: Many /HPF      I&O's Summary    20 Mar 2020 07:  -  21 Mar 2020 07:00  --------------------------------------------------------  IN: 1560 mL / OUT: 400 mL / NET: 1160 mL    21 Mar 2020 07:  -  21 Mar 2020 07:53  --------------------------------------------------------  IN: 110 mL / OUT: 0 mL / NET: 110 mL        RADIOLOGY & ADDITIONAL STUDIES:  < from: CT Abdomen and Pelvis w/ IV Cont (20 @ 13:27) >  Impression:   Dilated and thick-walled appendix measuring up to 1.2 cm in diameter, suspicious for  acute appendicitis. The appendix is pelvic. The appendix is situated medially to the right ovary and laterallyto the uterine fundus. No periappendiceal abscess or free air..  Incidental pericardial effusion.

## 2020-03-22 ENCOUNTER — TRANSCRIPTION ENCOUNTER (OUTPATIENT)
Age: 60
End: 2020-03-22

## 2020-03-22 VITALS
HEART RATE: 68 BPM | OXYGEN SATURATION: 99 % | TEMPERATURE: 98 F | SYSTOLIC BLOOD PRESSURE: 120 MMHG | DIASTOLIC BLOOD PRESSURE: 68 MMHG | RESPIRATION RATE: 17 BRPM

## 2020-03-22 LAB
ANION GAP SERPL CALC-SCNC: 19 MMOL/L — HIGH (ref 5–17)
BUN SERPL-MCNC: 9 MG/DL — SIGNIFICANT CHANGE UP (ref 7–23)
CALCIUM SERPL-MCNC: 8.7 MG/DL — SIGNIFICANT CHANGE UP (ref 8.4–10.5)
CHLORIDE SERPL-SCNC: 100 MMOL/L — SIGNIFICANT CHANGE UP (ref 96–108)
CO2 SERPL-SCNC: 20 MMOL/L — LOW (ref 22–31)
CREAT SERPL-MCNC: 0.72 MG/DL — SIGNIFICANT CHANGE UP (ref 0.5–1.3)
GLUCOSE BLDC GLUCOMTR-MCNC: 45 MG/DL — CRITICAL LOW (ref 70–99)
GLUCOSE SERPL-MCNC: 43 MG/DL — CRITICAL LOW (ref 70–99)
HCT VFR BLD CALC: 34.4 % — LOW (ref 34.5–45)
HCV AB S/CO SERPL IA: 0.22 S/CO — SIGNIFICANT CHANGE UP
HCV AB SERPL-IMP: SIGNIFICANT CHANGE UP
HGB BLD-MCNC: 10.8 G/DL — LOW (ref 11.5–15.5)
MAGNESIUM SERPL-MCNC: 1.8 MG/DL — SIGNIFICANT CHANGE UP (ref 1.6–2.6)
MCHC RBC-ENTMCNC: 28.8 PG — SIGNIFICANT CHANGE UP (ref 27–34)
MCHC RBC-ENTMCNC: 31.4 GM/DL — LOW (ref 32–36)
MCV RBC AUTO: 91.7 FL — SIGNIFICANT CHANGE UP (ref 80–100)
NRBC # BLD: 0 /100 WBCS — SIGNIFICANT CHANGE UP (ref 0–0)
PHOSPHATE SERPL-MCNC: 3.2 MG/DL — SIGNIFICANT CHANGE UP (ref 2.5–4.5)
PLATELET # BLD AUTO: 206 K/UL — SIGNIFICANT CHANGE UP (ref 150–400)
POTASSIUM SERPL-MCNC: 3.6 MMOL/L — SIGNIFICANT CHANGE UP (ref 3.5–5.3)
POTASSIUM SERPL-SCNC: 3.6 MMOL/L — SIGNIFICANT CHANGE UP (ref 3.5–5.3)
RBC # BLD: 3.75 M/UL — LOW (ref 3.8–5.2)
RBC # FLD: 13.3 % — SIGNIFICANT CHANGE UP (ref 10.3–14.5)
SODIUM SERPL-SCNC: 139 MMOL/L — SIGNIFICANT CHANGE UP (ref 135–145)
WBC # BLD: 6.81 K/UL — SIGNIFICANT CHANGE UP (ref 3.8–10.5)
WBC # FLD AUTO: 6.81 K/UL — SIGNIFICANT CHANGE UP (ref 3.8–10.5)

## 2020-03-22 PROCEDURE — 86901 BLOOD TYPING SEROLOGIC RH(D): CPT

## 2020-03-22 PROCEDURE — 85027 COMPLETE CBC AUTOMATED: CPT

## 2020-03-22 PROCEDURE — 99238 HOSP IP/OBS DSCHRG MGMT 30/<: CPT

## 2020-03-22 PROCEDURE — 83735 ASSAY OF MAGNESIUM: CPT

## 2020-03-22 PROCEDURE — 80048 BASIC METABOLIC PNL TOTAL CA: CPT

## 2020-03-22 PROCEDURE — 96374 THER/PROPH/DIAG INJ IV PUSH: CPT | Mod: XU

## 2020-03-22 PROCEDURE — 85610 PROTHROMBIN TIME: CPT

## 2020-03-22 PROCEDURE — 87086 URINE CULTURE/COLONY COUNT: CPT

## 2020-03-22 PROCEDURE — 74177 CT ABD & PELVIS W/CONTRAST: CPT

## 2020-03-22 PROCEDURE — 36415 COLL VENOUS BLD VENIPUNCTURE: CPT

## 2020-03-22 PROCEDURE — 82150 ASSAY OF AMYLASE: CPT

## 2020-03-22 PROCEDURE — 83690 ASSAY OF LIPASE: CPT

## 2020-03-22 PROCEDURE — 80053 COMPREHEN METABOLIC PANEL: CPT

## 2020-03-22 PROCEDURE — 86803 HEPATITIS C AB TEST: CPT

## 2020-03-22 PROCEDURE — 86850 RBC ANTIBODY SCREEN: CPT

## 2020-03-22 PROCEDURE — 85730 THROMBOPLASTIN TIME PARTIAL: CPT

## 2020-03-22 PROCEDURE — 82962 GLUCOSE BLOOD TEST: CPT

## 2020-03-22 PROCEDURE — 85025 COMPLETE CBC W/AUTO DIFF WBC: CPT

## 2020-03-22 PROCEDURE — 93306 TTE W/DOPPLER COMPLETE: CPT

## 2020-03-22 PROCEDURE — 99285 EMERGENCY DEPT VISIT HI MDM: CPT | Mod: 25

## 2020-03-22 PROCEDURE — 81001 URINALYSIS AUTO W/SCOPE: CPT

## 2020-03-22 PROCEDURE — 84100 ASSAY OF PHOSPHORUS: CPT

## 2020-03-22 RX ORDER — DIPHENHYDRAMINE HCL 50 MG
25 CAPSULE ORAL AT BEDTIME
Refills: 0 | Status: DISCONTINUED | OUTPATIENT
Start: 2020-03-22 | End: 2020-03-22

## 2020-03-22 RX ORDER — ZOLPIDEM TARTRATE 10 MG/1
5 TABLET ORAL AT BEDTIME
Refills: 0 | Status: DISCONTINUED | OUTPATIENT
Start: 2020-03-22 | End: 2020-03-22

## 2020-03-22 RX ADMIN — CEFTRIAXONE 100 MILLIGRAM(S): 500 INJECTION, POWDER, FOR SOLUTION INTRAMUSCULAR; INTRAVENOUS at 13:03

## 2020-03-22 RX ADMIN — HEPARIN SODIUM 5000 UNIT(S): 5000 INJECTION INTRAVENOUS; SUBCUTANEOUS at 07:20

## 2020-03-22 RX ADMIN — Medication 100 MILLIGRAM(S): at 10:58

## 2020-03-22 RX ADMIN — Medication 100 MILLIGRAM(S): at 03:08

## 2020-03-22 NOTE — DISCHARGE NOTE PROVIDER - NSDCMRMEDTOKEN_GEN_ALL_CORE_FT
amoxicillin-clavulanate 875 mg-125 mg oral tablet: 1 tab(s) orally every 12 hours   Yuvafem 10 mcg vaginal tablet: 1 tab(s) vaginal 2 times a week

## 2020-03-22 NOTE — PROGRESS NOTE ADULT - SUBJECTIVE AND OBJECTIVE BOX
INTERVAL HPI/OVERNIGHT EVENTS: No acute events, repeat CT shows acute uncomplicated appendicitis               SUBJECTIVE:  Flatus: [ ] YES [ ] NO             Bowel Movement: [ ] YES [ ] NO  Pain (0-10):            Pain Control Adequate: [ ] YES [ ] NO  Nausea: [ ] YES [ ] NO            Vomiting: [ ] YES [ ] NO  Diarrhea: [ ] YES [ ] NO         Constipation: [ ] YES [ ] NO     Chest Pain: [ ] YES [ ] NO    SOB:  [ ] YES [ ] NO    MEDICATIONS  (STANDING):  cefTRIAXone   IVPB 1000 milliGRAM(s) IV Intermittent every 24 hours  heparin  Injectable 5000 Unit(s) SubCutaneous every 12 hours  lactated ringers. 1000 milliLiter(s) (110 mL/Hr) IV Continuous <Continuous>  metroNIDAZOLE  IVPB 500 milliGRAM(s) IV Intermittent every 8 hours  metroNIDAZOLE  IVPB        MEDICATIONS  (PRN):  acetaminophen  IVPB .. 1000 milliGRAM(s) IV Intermittent once PRN Moderate Pain (4 - 6)  ondansetron Injectable 4 milliGRAM(s) IV Push every 6 hours PRN Nausea  zolpidem 5 milliGRAM(s) Oral at bedtime PRN Insomnia      Vital Signs Last 24 Hrs  T(C): 36.6 (22 Mar 2020 09:22), Max: 36.7 (21 Mar 2020 16:59)  T(F): 97.8 (22 Mar 2020 09:22), Max: 98 (21 Mar 2020 16:59)  HR: 59 (22 Mar 2020 09:22) (52 - 69)  BP: 108/59 (22 Mar 2020 09:22) (100/52 - 110/66)  BP(mean): --  RR: 16 (22 Mar 2020 09:22) (16 - 17)  SpO2: 100% (22 Mar 2020 09:22) (98% - 100%)    PHYSICAL EXAM:      Constitutional: A&Ox3    Breasts:    Respiratory: non labored breathing, no respiratory distress    Cardiovascular: NSR, RRR    Gastrointestinal:                 Incision:    Genitourinary:    Extremities: (-) edema                  I&O's Detail    21 Mar 2020 07:01  -  22 Mar 2020 07:00  --------------------------------------------------------  IN:    IV PiggyBack: 50 mL    lactated ringers.: 2090 mL    Solution: 200 mL  Total IN: 2340 mL    OUT:    Voided: 700 mL  Total OUT: 700 mL    Total NET: 1640 mL          LABS:                        10.8   6.81  )-----------( 206      ( 22 Mar 2020 08:10 )             34.4     03-22    139  |  100  |  9   ----------------------------<  43<LL>  3.6   |  20<L>  |  0.72    Ca    8.7      22 Mar 2020 08:10  Phos  3.2     03-22  Mg     1.8     03-22      PT/INR - ( 21 Mar 2020 06:23 )   PT: 15.2 sec;   INR: 1.32          PTT - ( 21 Mar 2020 06:23 )  PTT:41.9 sec      RADIOLOGY & ADDITIONAL STUDIES: INTERVAL HPI/OVERNIGHT EVENTS: No acute events, repeat CT shows acute uncomplicated appendicitis               SUBJECTIVE:  Flatus: [x ] YES [ ] NO             Bowel Movement: [x ] YES [ ] NO  Pain (0-10):            Pain Control Adequate: [x ] YES [ ] NO  Nausea: [ ] YES [x ] NO            Vomiting: [ ] YES [x ] NO  Diarrhea: [ ] YES [x ] NO         Constipation: [ ] YES [x ] NO     Chest Pain: [ ] YES [ x] NO    SOB:  [ ] YES x[ ] NO    MEDICATIONS  (STANDING):  cefTRIAXone   IVPB 1000 milliGRAM(s) IV Intermittent every 24 hours  heparin  Injectable 5000 Unit(s) SubCutaneous every 12 hours  lactated ringers. 1000 milliLiter(s) (110 mL/Hr) IV Continuous <Continuous>  metroNIDAZOLE  IVPB 500 milliGRAM(s) IV Intermittent every 8 hours  metroNIDAZOLE  IVPB        MEDICATIONS  (PRN):  acetaminophen  IVPB .. 1000 milliGRAM(s) IV Intermittent once PRN Moderate Pain (4 - 6)  ondansetron Injectable 4 milliGRAM(s) IV Push every 6 hours PRN Nausea  zolpidem 5 milliGRAM(s) Oral at bedtime PRN Insomnia      Vital Signs Last 24 Hrs  T(C): 36.6 (22 Mar 2020 09:22), Max: 36.7 (21 Mar 2020 16:59)  T(F): 97.8 (22 Mar 2020 09:22), Max: 98 (21 Mar 2020 16:59)  HR: 59 (22 Mar 2020 09:22) (52 - 69)  BP: 108/59 (22 Mar 2020 09:22) (100/52 - 110/66)  BP(mean): --  RR: 16 (22 Mar 2020 09:22) (16 - 17)  SpO2: 100% (22 Mar 2020 09:22) (98% - 100%)    PHYSICAL EXAM:      Constitutional: A&Ox3    Respiratory: non labored breathing, no respiratory distress    Cardiovascular: NSR, RRR    Gastrointestinal: Soft, non distended, mild RLQ, no guarding or rebound                 Incision:    Genitourinary: voids    Extremities: (-) edema                  I&O's Detail    21 Mar 2020 07:01  -  22 Mar 2020 07:00  --------------------------------------------------------  IN:    IV PiggyBack: 50 mL    lactated ringers.: 2090 mL    Solution: 200 mL  Total IN: 2340 mL    OUT:    Voided: 700 mL  Total OUT: 700 mL    Total NET: 1640 mL          LABS:                        10.8   6.81  )-----------( 206      ( 22 Mar 2020 08:10 )             34.4     03-22    139  |  100  |  9   ----------------------------<  43<LL>  3.6   |  20<L>  |  0.72    Ca    8.7      22 Mar 2020 08:10  Phos  3.2     03-22  Mg     1.8     03-22      PT/INR - ( 21 Mar 2020 06:23 )   PT: 15.2 sec;   INR: 1.32          PTT - ( 21 Mar 2020 06:23 )  PTT:41.9 sec      RADIOLOGY & ADDITIONAL STUDIES:

## 2020-03-22 NOTE — DISCHARGE NOTE PROVIDER - NSDCACTIVITY_GEN_ALL_CORE
Bathing allowed/Driving allowed/Walking - Outdoors allowed/No heavy lifting/straining/Stairs allowed/Do not drive or operate machinery/Do not make important decisions/Walking - Indoors allowed/Showering allowed

## 2020-03-22 NOTE — DISCHARGE NOTE PROVIDER - NSDCCPCAREPLAN_GEN_ALL_CORE_FT
PRINCIPAL DISCHARGE DIAGNOSIS  Diagnosis: Acute appendicitis with localized peritonitis, without perforation, abscess, or gangrene  Assessment and Plan of Treatment:       SECONDARY DISCHARGE DIAGNOSES  Diagnosis: Acute cystitis with hematuria  Assessment and Plan of Treatment:

## 2020-03-22 NOTE — DISCHARGE NOTE PROVIDER - CARE PROVIDER_API CALL
Marilu Camarillo)  Surgery  [address]  [place], [place] [address]  Phone: [phone]  Fax: [phone]  Follow Up Time:

## 2020-03-22 NOTE — DISCHARGE NOTE PROVIDER - NSDCFUADDINST_GEN_ALL_CORE_FT
Return to the hospital if you are experiencing worsening pain, fever, chills, nausea, or vomiting.  Take the antibiotics: Augmentin 875mg twice a day for 7 days as sent to your pharmacy [facility] [address] [place] [address]

## 2020-03-22 NOTE — DISCHARGE NOTE PROVIDER - INSTRUCTIONS
Plan:  Discharge home on regular diet-advance diet slowly from bland, soft foods to more solids as you are able  Follow up with Dr. Camarillo this week

## 2020-03-22 NOTE — PROGRESS NOTE ADULT - ASSESSMENT
59F PMH mitral valve prolapse presents with abdominal pain and flu like symptoms for 3-4 nights (abd pain starting last night), WBC 13 and CT findings of dilated and thick-walled appendix measuring up to 1.2 cm in diameter, suspicious for acute appendicitis, appendix is pelvic.    NPO/IVF  Pain/nausea control  OOBA/IS/SCDs  Ceftriaxone/flagyl  Cardiology consult
60 y/o F with appendicitis- improving  -Advance diet to clears then regular if tolerated  -F/u labs  -repeat abdominal exams  -Discharge home on Augmentin x 1 week

## 2020-03-22 NOTE — DISCHARGE NOTE NURSING/CASE MANAGEMENT/SOCIAL WORK - PATIENT PORTAL LINK FT
You can access the FollowMyHealth Patient Portal offered by [facility] by registering at the following website: [email or web address]. By joining [facility]’s FollowMyHealth portal, you will also be able to view your health information using other applications (apps) compatible with our system.

## 2020-03-22 NOTE — DISCHARGE NOTE PROVIDER - HOSPITAL COURSE
58 y/o Female with abdominal pain found to have uncomplicated appendicitis. She was admitted and made NPO and pain was managed. She was evaluated by cardiology due to tachycardia, which resolved with one dose of lopressor. Cardiology stated likely a-fib, after echocardiogram was performed, but declined to start anticoagulation. The decision was made to manage the patient medically at this time. She improved and her diet was advanced. She was able to tolerate small amounts of food. She did have some loose stools however these were likely attributed to the contrast she drank for her CT. She remained afebrile and her WBC improved. She has been ambulating throughout the unit without difficulty and appears stable for discharge home. 58 y/o Female with abdominal pain found to have uncomplicated appendicitis. She was admitted and made NPO and pain was managed. She was evaluated by cardiology due to tachycardia, which resolved with one dose of lopressor. Cardiology stated likely a-fib, after echocardiogram was performed, but declined to start anticoagulation. The decision was made to manage the patient medically at this time. She improved and her diet was advanced. She was able to tolerate small amounts of food. She did have some loose stools however these were likely attributed to the contrast she drank for her CT. She remained afebrile and her WBC improved. She has been ambulating throughout the unit without difficulty and appears stable for discharge home.        Plan:    Discharge home on regular diet-advance diet slowly from bland, soft foods to more solids as you are able    Follow up with Dr. Camarillo this week    Return to the hospital if you are experiencing worsening pain, fever, chills, nausea, or vomiting.    Take the antibiotics: Augmentin 875mg twice a day for 7 days as sent to your pharmacy [facility] [address] [place] [address]

## 2020-03-25 DIAGNOSIS — I48.91 UNSPECIFIED ATRIAL FIBRILLATION: ICD-10-CM

## 2020-03-25 DIAGNOSIS — D18.03 HEMANGIOMA OF INTRA-ABDOMINAL STRUCTURES: ICD-10-CM

## 2020-03-25 DIAGNOSIS — K35.80 UNSPECIFIED ACUTE APPENDICITIS: ICD-10-CM

## 2020-03-25 DIAGNOSIS — Z88.6 ALLERGY STATUS TO ANALGESIC AGENT: ICD-10-CM

## 2020-03-25 DIAGNOSIS — T50.995A ADVERSE EFFECT OF OTHER DRUGS, MEDICAMENTS AND BIOLOGICAL SUBSTANCES, INITIAL ENCOUNTER: ICD-10-CM
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
## 2020-03-25 DIAGNOSIS — I31.3 PERICARDIAL EFFUSION (NONINFLAMMATORY): ICD-10-CM

## 2020-03-25 DIAGNOSIS — I97.89 OTHER POSTPROCEDURAL COMPLICATIONS AND DISORDERS OF THE CIRCULATORY SYSTEM, NOT ELSEWHERE CLASSIFIED: ICD-10-CM

## 2020-03-25 DIAGNOSIS — N30.01 ACUTE CYSTITIS WITH HEMATURIA: ICD-10-CM

## 2020-03-25 DIAGNOSIS — R10.9 UNSPECIFIED ABDOMINAL PAIN: ICD-10-CM

## 2020-03-25 DIAGNOSIS — K52.1 TOXIC GASTROENTERITIS AND COLITIS: ICD-10-CM

## 2020-03-25 DIAGNOSIS — D25.9 LEIOMYOMA OF UTERUS, UNSPECIFIED: ICD-10-CM

## 2020-11-25 PROBLEM — Z78.9 OTHER SPECIFIED HEALTH STATUS: Chronic | Status: ACTIVE | Noted: 2020-03-20

## 2020-12-29 PROBLEM — Z00.00 ENCOUNTER FOR PREVENTIVE HEALTH EXAMINATION: Status: ACTIVE | Noted: 2020-12-29

## 2020-12-31 ENCOUNTER — APPOINTMENT (OUTPATIENT)
Dept: GASTROENTEROLOGY | Facility: CLINIC | Age: 60
End: 2020-12-31
Payer: COMMERCIAL

## 2020-12-31 VITALS
OXYGEN SATURATION: 98 % | HEART RATE: 76 BPM | RESPIRATION RATE: 15 BRPM | SYSTOLIC BLOOD PRESSURE: 98 MMHG | TEMPERATURE: 97.2 F | HEIGHT: 70 IN | DIASTOLIC BLOOD PRESSURE: 69 MMHG | WEIGHT: 129 LBS | BODY MASS INDEX: 18.47 KG/M2

## 2020-12-31 DIAGNOSIS — K59.09 OTHER CONSTIPATION: ICD-10-CM

## 2020-12-31 PROCEDURE — 99072 ADDL SUPL MATRL&STAF TM PHE: CPT

## 2020-12-31 PROCEDURE — 99204 OFFICE O/P NEW MOD 45 MIN: CPT

## 2021-01-04 NOTE — HISTORY OF PRESENT ILLNESS
[de-identified] : 60F ref by LARRY ([facility] PT) for multiple GI questions\par \par acute appendicitis 3/20 this year s/p antibiotics then had appendectomy 5/2020 then PAIN started 4 days after going home with antibiotics lower central abdomen. AFTER BOWEL MOVEMENT, DULL, doesn’t wake up at night. also had arrythmia during this time with liquid aorund the heart\par \par on/off constipation-  even before appendectomy type 1 [place] stool chart. eats bread +cheese\par \par hemithyroidectomy and thyroid levels abnormal with TSH ~6.99 but normal T4\par \par had liver cyst finding on CT that was confirmed by MRI to be hemangioma\par \par Other= sensitive to salt, smells. having musculoskeletal issues that limit her walking/ambulation\par \par EGD 2016\par Colonoscopy 3/2020

## 2021-01-04 NOTE — PHYSICAL EXAM
[General Appearance - Alert] : alert [General Appearance - In No Acute Distress] : in no acute distress [Sclera] : the sclera and conjunctiva were normal [Outer Ear] : the ears and nose were normal in appearance [] : no respiratory distress [Apical Impulse] : the apical impulse was normal [Edema] : there was no peripheral edema [Bowel Sounds] : normal bowel sounds [Abdomen Soft] : soft [Abdomen Tenderness] : non-tender [Abdomen Mass (___ Cm)] : no abdominal mass palpated [No Spinal Tenderness] : no spinal tenderness [Involuntary Movements] : no involuntary movements were seen [No Focal Deficits] : no focal deficits [Mood] : the mood was normal

## 2021-01-04 NOTE — ASSESSMENT
[FreeTextEntry1] : 60F with abdominal pain likely related to constipation\par - will try supportive care for constipation with magnesium, increased dietary fiber and cutting out constipating foods like cheese, increase hydration, increase ambulation as much as possible\par - if no relief, will continue workup with ARM, smartpill etc.\par - f/u 3mo

## 2021-01-05 ENCOUNTER — TRANSCRIPTION ENCOUNTER (OUTPATIENT)
Age: 61
End: 2021-01-05

## 2021-01-05 LAB
ALBUMIN SERPL ELPH-MCNC: 4.4 G/DL
ALP BLD-CCNC: 49 U/L
ALT SERPL-CCNC: 17 U/L
ANION GAP SERPL CALC-SCNC: 12 MMOL/L
AST SERPL-CCNC: 24 U/L
BILIRUB SERPL-MCNC: 0.2 MG/DL
BUN SERPL-MCNC: 20 MG/DL
CALCIUM SERPL-MCNC: 9.2 MG/DL
CHLORIDE SERPL-SCNC: 102 MMOL/L
CO2 SERPL-SCNC: 26 MMOL/L
CREAT SERPL-MCNC: 0.9 MG/DL
GLUCOSE SERPL-MCNC: 95 MG/DL
POTASSIUM SERPL-SCNC: 4.4 MMOL/L
PROT SERPL-MCNC: 7.5 G/DL
SODIUM SERPL-SCNC: 140 MMOL/L

## 2021-02-02 ENCOUNTER — TRANSCRIPTION ENCOUNTER (OUTPATIENT)
Age: 61
End: 2021-02-02

## 2021-02-08 ENCOUNTER — TRANSCRIPTION ENCOUNTER (OUTPATIENT)
Age: 61
End: 2021-02-08

## 2021-02-12 ENCOUNTER — APPOINTMENT (OUTPATIENT)
Dept: GASTROENTEROLOGY | Facility: CLINIC | Age: 61
End: 2021-02-12
Payer: COMMERCIAL

## 2021-02-12 VITALS
OXYGEN SATURATION: 98 % | HEART RATE: 67 BPM | DIASTOLIC BLOOD PRESSURE: 61 MMHG | BODY MASS INDEX: 18.32 KG/M2 | SYSTOLIC BLOOD PRESSURE: 99 MMHG | HEIGHT: 70 IN | TEMPERATURE: 97.5 F | WEIGHT: 128 LBS

## 2021-02-12 DIAGNOSIS — L60.3 NAIL DYSTROPHY: ICD-10-CM

## 2021-02-12 PROCEDURE — 99072 ADDL SUPL MATRL&STAF TM PHE: CPT

## 2021-02-12 PROCEDURE — 99215 OFFICE O/P EST HI 40 MIN: CPT

## 2021-02-12 NOTE — PHYSICAL EXAM
[General Appearance - Alert] : alert [General Appearance - In No Acute Distress] : in no acute distress [Sclera] : the sclera and conjunctiva were normal [Outer Ear] : the ears and nose were normal in appearance [] : no respiratory distress [Apical Impulse] : the apical impulse was normal [Edema] : there was no peripheral edema [Bowel Sounds] : normal bowel sounds [Abdomen Soft] : soft [Abdomen Tenderness] : non-tender [Abdomen Mass (___ Cm)] : no abdominal mass palpated [No Spinal Tenderness] : no spinal tenderness [Involuntary Movements] : no involuntary movements were seen [FreeTextEntry1] : splitting nails [No Focal Deficits] : no focal deficits [Mood] : the mood was normal

## 2021-02-12 NOTE — ASSESSMENT
[FreeTextEntry1] : 60F with abdominal pain and chronic reflux intolerant to PPIs but responded to REGLAN therefore likely dysmotiltiy\par - smart pill \par - iberograst\par - alginate for reflux\par - pelvic floor/abdominal work\par - magnesium, increased dietary fiber and cutting out constipating foods like cheese, increase hydration, increase ambulation as much as possible\par - if no relief, will continue workup with ARM\par - check nutritional deficiencies assoc with brittle nails (iron panel nml), refer to DERM\par - f/u after above

## 2021-02-12 NOTE — HISTORY OF PRESENT ILLNESS
[de-identified] : 60F ref by LARRY ([facility] PT) for multiple GI questions\par 2/12/21\par 10 years ago acid took aciphex but bad reaction\par 4-5 yrs ago had another endoscopy \par reglan helped\par now found to have LPR\par tried dexilant and got terrible headache\par tried psyllium husk \par now sometimes normal/sometimes constipated\par \par \par PREVIOUS HX\par acute appendicitis 3/20 this year s/p antibiotics then had appendectomy 5/2020 then PAIN started 4 days after going home with antibiotics lower central abdomen. AFTER BOWEL MOVEMENT, DULL, doesn’t wake up at night. also had arrythmia during this time with liquid aorund the heart\par \par on/off constipation-  even before appendectomy type 1 [place] stool chart. eats bread +cheese\par \par hemithyroidectomy and thyroid levels abnormal with TSH ~6.99 but normal T4\par \par had liver cyst finding on CT that was confirmed by MRI to be hemangioma\par \par Other= sensitive to salt, smells. having musculoskeletal issues that limit her walking/ambulation\par \par EGD 2016\par Colonoscopy 3/2020

## 2021-02-12 NOTE — HISTORY OF PRESENT ILLNESS
[de-identified] : 60F ref by LARRY ([facility] FLO[facility] PT) for multiple GI questions\par 2/12/21\par 10 years ago acid took aciphex but bad reaction\par 4-5 yrs ago had another endoscopy \par reglan helped\par now found to have LPR\par tried dexilant and got terrible headache\par tried psyllium husk \par now sometimes normal/sometimes constipated\par \par \par PREVIOUS HX\par acute appendicitis 3/20 this year s/p antibiotics then had appendectomy 5/2020 then PAIN started 4 days after going home with antibiotics lower central abdomen. AFTER BOWEL MOVEMENT, DULL, doesn’t wake up at night. also had arrythmia during this time with liquid aorund the heart\par \par on/off constipation-  even before appendectomy type 1 [place] stool chart. eats bread +cheese\par \par hemithyroidectomy and thyroid levels abnormal with TSH ~6.99 but normal T4\par \par had liver cyst finding on CT that was confirmed by MRI to be hemangioma\par \par Other= sensitive to salt, smells. having musculoskeletal issues that limit her walking/ambulation\par \par EGD 2016\par Colonoscopy 3/2020

## 2021-02-17 ENCOUNTER — TRANSCRIPTION ENCOUNTER (OUTPATIENT)
Age: 61
End: 2021-02-17

## 2021-02-17 LAB
25(OH)D3 SERPL-MCNC: 30.7 NG/ML
FOLATE SERPL-MCNC: 15.2 NG/ML
MAGNESIUM SERPL-MCNC: 2.2 MG/DL
VIT B12 SERPL-MCNC: 1119 PG/ML
ZINC SERPL-MCNC: 98 UG/DL

## 2021-02-23 ENCOUNTER — TRANSCRIPTION ENCOUNTER (OUTPATIENT)
Age: 61
End: 2021-02-23

## 2021-02-23 LAB
NICOTINAMIDE: 42.5 NG/ML
NICOTINIC ACID: <5 NG/ML
VIT B2 SERPL-MCNC: 274 UG/L
VIT B6 SERPL-MCNC: 65.7 UG/L
VIT C SERPL-MCNC: 0.5 MG/DL

## 2021-03-03 ENCOUNTER — TRANSCRIPTION ENCOUNTER (OUTPATIENT)
Age: 61
End: 2021-03-03

## 2021-03-05 ENCOUNTER — TRANSCRIPTION ENCOUNTER (OUTPATIENT)
Age: 61
End: 2021-03-05

## 2021-03-10 ENCOUNTER — TRANSCRIPTION ENCOUNTER (OUTPATIENT)
Age: 61
End: 2021-03-10

## 2021-03-20 ENCOUNTER — TRANSCRIPTION ENCOUNTER (OUTPATIENT)
Age: 61
End: 2021-03-20

## 2021-03-31 ENCOUNTER — TRANSCRIPTION ENCOUNTER (OUTPATIENT)
Age: 61
End: 2021-03-31

## 2021-04-05 ENCOUNTER — TRANSCRIPTION ENCOUNTER (OUTPATIENT)
Age: 61
End: 2021-04-05

## 2021-04-22 ENCOUNTER — TRANSCRIPTION ENCOUNTER (OUTPATIENT)
Age: 61
End: 2021-04-22

## 2021-05-06 ENCOUNTER — APPOINTMENT (OUTPATIENT)
Dept: DERMATOLOGY | Facility: CLINIC | Age: 61
End: 2021-05-06
Payer: COMMERCIAL

## 2021-05-06 DIAGNOSIS — L60.3 NAIL DYSTROPHY: ICD-10-CM

## 2021-05-06 PROCEDURE — 99072 ADDL SUPL MATRL&STAF TM PHE: CPT

## 2021-05-06 PROCEDURE — 99203 OFFICE O/P NEW LOW 30 MIN: CPT

## 2021-05-06 RX ORDER — FOLIC ACID 1 MG/1
1 TABLET ORAL DAILY
Qty: 30 | Refills: 11 | Status: ACTIVE | COMMUNITY
Start: 2021-05-06 | End: 1900-01-01

## 2021-05-06 NOTE — CONSULT LETTER
[Dear  ___] : Dear  [unfilled], [Consult Letter:] : I had the pleasure of evaluating your patient, [unfilled]. [Please see my note below.] : Please see my note below. [Consult Closing:] : Thank you very much for allowing me to participate in the care of this patient.  If you have any questions, please do not hesitate to contact me. [Sincerely,] : Sincerely, [FreeTextEntry3] : Delphine Mendoza MD\par [facility]\par

## 2021-05-06 NOTE — HISTORY OF PRESENT ILLNESS
[FreeTextEntry1] : nails [de-identified] : referred by Dr Valverde\par fingernails - told has brittle nails, extremely fragile, avoids water, using citra 2 for thin peeling nails and ammonium lactate but only at night and touches eyes at night (read light sensitive)\par \par toenails - 2nd left and 3rd right developed fungus (tested) 2 years ago, one on left fell off and regrew but very painful so had ingrowns removed on both nails\par did not use any fungus treatment\par more recently went to "medi pedi" given oil, also were digging under to remove some of the thickening\par also given nystatin/trimacinolone for nails to use nightly and wrap in plastic x 30 days\par nail improved look but still pain and growing back thick so told to use urea 40% for 2 mos\par wants second opinion\par \par

## 2021-05-06 NOTE — PHYSICAL EXAM
[Alert] : alert [Oriented x 3] : ~L oriented x 3 [Well Nourished] : well nourished [Conjunctiva Non-injected] : conjunctiva non-injected [No Visual Lymphadenopathy] : no visual  lymphadenopathy [No Clubbing] : no clubbing [No Edema] : no edema [No Bromhidrosis] : no bromhidrosis [No Chromhidrosis] : no chromhidrosis [FreeTextEntry3] : some onychoschizia fingernails with clear polish\par toenails - right 3rd and left 2nd toenails are short, narrowed compared to the other toenails, and without thickening or subunugual debris\par

## 2021-05-17 ENCOUNTER — TRANSCRIPTION ENCOUNTER (OUTPATIENT)
Age: 61
End: 2021-05-17

## 2021-05-25 ENCOUNTER — TRANSCRIPTION ENCOUNTER (OUTPATIENT)
Age: 61
End: 2021-05-25

## 2021-07-28 ENCOUNTER — APPOINTMENT (OUTPATIENT)
Dept: GASTROENTEROLOGY | Facility: HOSPITAL | Age: 61
End: 2021-07-28

## 2021-10-25 DIAGNOSIS — K21.9 GASTRO-ESOPHAGEAL REFLUX DISEASE W/OUT ESOPHAGITIS: ICD-10-CM

## 2021-10-25 RX ORDER — OMEPRAZOLE 10 MG/1
10 CAPSULE, DELAYED RELEASE ORAL
Qty: 30 | Refills: 2 | Status: ACTIVE | COMMUNITY
Start: 2021-10-25 | End: 1900-01-01

## 2021-10-30 ENCOUNTER — TRANSCRIPTION ENCOUNTER (OUTPATIENT)
Age: 61
End: 2021-10-30

## 2021-11-01 ENCOUNTER — TRANSCRIPTION ENCOUNTER (OUTPATIENT)
Age: 61
End: 2021-11-01

## 2021-11-09 ENCOUNTER — TRANSCRIPTION ENCOUNTER (OUTPATIENT)
Age: 61
End: 2021-11-09

## 2021-11-10 ENCOUNTER — TRANSCRIPTION ENCOUNTER (OUTPATIENT)
Age: 61
End: 2021-11-10

## 2021-11-12 ENCOUNTER — RESULT REVIEW (OUTPATIENT)
Age: 61
End: 2021-11-12

## 2021-11-12 ENCOUNTER — APPOINTMENT (OUTPATIENT)
Dept: GASTROENTEROLOGY | Facility: CLINIC | Age: 61
End: 2021-11-12
Payer: COMMERCIAL

## 2021-11-12 PROCEDURE — 43239 EGD BIOPSY SINGLE/MULTIPLE: CPT

## 2022-04-16 NOTE — PATIENT PROFILE ADULT - NSPROEDALEARNPREF_GEN_A_NUR
Patient to room with mom and complaint of left ear pain and pain on left side of throat. States symptoms started yesterday. Mom states patient had 102 temp yesterday.
individual instruction